# Patient Record
Sex: MALE | Race: WHITE
[De-identification: names, ages, dates, MRNs, and addresses within clinical notes are randomized per-mention and may not be internally consistent; named-entity substitution may affect disease eponyms.]

---

## 2018-10-01 ENCOUNTER — HOSPITAL ENCOUNTER (EMERGENCY)
Dept: HOSPITAL 63 - ER | Age: 63
Discharge: HOME | End: 2018-10-01
Payer: SELF-PAY

## 2018-10-01 VITALS
BODY MASS INDEX: 32.21 KG/M2 | SYSTOLIC BLOOD PRESSURE: 157 MMHG | WEIGHT: 225 LBS | HEIGHT: 70 IN | DIASTOLIC BLOOD PRESSURE: 113 MMHG
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
## 2018-10-01 DIAGNOSIS — Z88.6: ICD-10-CM
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
## 2018-10-01 DIAGNOSIS — Z88.0: ICD-10-CM

## 2018-10-01 DIAGNOSIS — Z91.041: ICD-10-CM
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
## 2018-10-01 DIAGNOSIS — S61.011A: Primary | ICD-10-CM

## 2018-10-01 PROCEDURE — 90471 IMMUNIZATION ADMIN: CPT

## 2018-10-01 PROCEDURE — 90714 TD VACC NO PRESV 7 YRS+ IM: CPT

## 2018-10-01 PROCEDURE — 12002 RPR S/N/AX/GEN/TRNK2.6-7.5CM: CPT

## 2018-10-01 NOTE — ED.ADGEN
Past History


Past Medical History:  Other


Past Surgical History:  Other


Alcohol Use:  Occasionally


Drug Use:  None





Adult General


Chief Complaint


Chief Complaint


".. I was cutting a water melon.. and slice my  Rt. thumb and nail..."..." I 

happen just before I came in..."





HPI


HPI





Patient is a 62 year old male who presents with above hx and  3 cm laceration 

to  right   thumb nail.  The laceration is shallow however does go through the 

nail and into the underlying nail soft tissue.  Distal neurovascular intact. 

Patient does not return his last tetanus. Patient is ambidextrous. Does travel 

for his company. No history immunosuppression. Normally healthy. Has follow-up 

primary care.





Review of Systems


Review of Systems





Constitutional: Denies fever or chills []


Eyes: Denies change in visual acuity, redness, or eye pain []


HENT: Denies nasal congestion or sore throat []


Respiratory: Denies cough or shortness of breath []


Cardiovascular: No additional information not addressed in HPI []


GI: Denies abdominal pain, nausea, vomiting, bloody stools or diarrhea []


: Denies dysuria or hematuria []


Musculoskeletal: Denies back pain or joint pain []


Integument: Denies rash or skin lesions []complaints of laceration of right 

thumb nail


Neurologic: Denies headache, focal weakness or sensory changes []


Endocrine: Denies polyuria or polydipsia []





All other systems were reviewed and found to be within normal limits, except as 

documented in this note.





Family History


Family History


Noncontributory





Current Medications


Current Medications





Current Medications








 Medications


  (Trade)  Dose


 Ordered  Sig/[facility]  Start Time


 Stop Time Status Last Admin


Dose Admin


 


 Tetanus/


 Diphtheria


 Toxoids Adsorbed


  (Tenivac Vial)  0.5 ml  ONCE ONCE  10/1/18 19:00


 10/1/18 19:01 DC 10/1/18 19:06


0.5 ML





See nursing for home meds





Allergies


Allergies





Allergies








Coded Allergies Type Severity Reaction Last Updated Verified


 


  Penicillins Allergy Unknown  10/1/18 Yes


 


  ibuprofen Allergy Unknown  10/1/18 Yes


 


  iodine Allergy Unknown  10/1/18 Yes











Physical Exam


Physical Exam





Constitutional: Well developed, well nourished, no acute distress, non-toxic 

appearance. []


HENT: Normocephalic, atraumatic, bilateral external ears normal, oropharynx 

moist, no oral exudates, nose normal. []


Eyes: PERRLA, EOMI, conjunctiva normal, no discharge. [] 


Neck: Normal range of motion, no tenderness, supple, no stridor. [] 


Cardiovascular:Heart rate regular rhythm, no murmur []


Lungs & Thorax:  Bilateral breath sounds clear to auscultation []


Abdomen: Bowel sounds normal, soft, no tenderness, no masses, no pulsatile 

masses. [] 


Skin: Warm, dry, no erythema, no rash. [] Laceration as per history of present 

illness


Back: No tenderness, no CVA tenderness. [] 


Extremities: No tenderness, no cyanosis, no clubbing, ROM intact, no edema. [] 


Neurologic: Alert and oriented X 3, normal motor function, normal sensory 

function, no focal deficits noted. []


Psychologic: Affect normal, judgement normal, mood normal. []





Current Patient Data


Vital Signs





 Vital Signs








  Date Time  Temp Pulse Resp B/P (MAP) Pulse Ox O2 Delivery O2 Flow Rate FiO2


 


10/1/18 16:32 98.3 52 18  95 Room Air  











EKG


EKG


[]





Radiology/Procedures


Radiology/Procedures


[]





Course & Med Decision Making


Course & Med Decision Making


Pertinent Labs and Imaging studies reviewed. (See chart for details).





Wound wash with surgical soap. Saline and peroxide. Application of tissue 

loose. And dressing applied.








  Keep laceration clean and dry. Do not apply ointment to laceration. Will 

dissolved to glue. The removed dressing becomes wet or dirty. Follow-up primary 

care. Monitor for infection. Patient informed he'll take a long time for the 

nail to grow out. Patient return if any concerns.











[]





Final Impression


Final Impression


1. 3 cm laceration, thumb nail of right hand[]





Dragon Disclaimer


Dragon Disclaimer


This electronic medical record was generated, in whole or in part, using a 

voice recognition dictation system.











SHANIKA ACEVES MD Oct 1, 2018 18:54

## 2021-10-17 ENCOUNTER — HOSPITAL ENCOUNTER (INPATIENT)
Dept: HOSPITAL 63 - ER | Age: 66
LOS: 2 days | Discharge: HOME | DRG: 177 | End: 2021-10-19
Attending: HOSPITALIST | Admitting: HOSPITALIST
Payer: COMMERCIAL

## 2021-10-17 VITALS — WEIGHT: 249.56 LBS | BODY MASS INDEX: 34.17 KG/M2 | HEIGHT: 71.5 IN

## 2021-10-17 DIAGNOSIS — R09.02: ICD-10-CM

## 2021-10-17 DIAGNOSIS — J12.82: ICD-10-CM

## 2021-10-17 DIAGNOSIS — Z88.0: ICD-10-CM

## 2021-10-17 DIAGNOSIS — U07.1: Primary | ICD-10-CM

## 2021-10-17 DIAGNOSIS — Z88.8: ICD-10-CM

## 2021-10-17 LAB
% BANDS: 5 % (ref 0–9)
% LYMPHS: 11 % (ref 24–48)
% MONOS: 9 % (ref 0–10)
% SEGS: 75 % (ref 35–66)
ALBUMIN SERPL-MCNC: 3.1 G/DL (ref 3.4–5)
ALBUMIN/GLOB SERPL: 0.8 {RATIO} (ref 1–1.7)
ALP SERPL-CCNC: 57 U/L (ref 46–116)
ALT SERPL-CCNC: 53 U/L (ref 16–63)
ANION GAP SERPL CALC-SCNC: 10 MMOL/L (ref 6–14)
APTT PPP: YELLOW S
AST SERPL-CCNC: 45 U/L (ref 15–37)
BACTERIA #/AREA URNS HPF: 0 /HPF
BASOPHILS # BLD AUTO: 0 X10^3/UL (ref 0–0.2)
BASOPHILS NFR BLD: 0 % (ref 0–3)
BILIRUB SERPL-MCNC: 0.4 MG/DL (ref 0.2–1)
BILIRUB UR QL STRIP: (no result)
BUN/CREAT SERPL: 21 (ref 6–20)
CA-I SERPL ISE-MCNC: 17 MG/DL (ref 8–26)
CALCIUM SERPL-MCNC: 8.2 MG/DL (ref 8.5–10.1)
CHLORIDE SERPL-SCNC: 94 MMOL/L (ref 98–107)
CO2 SERPL-SCNC: 25 MMOL/L (ref 21–32)
CREAT SERPL-MCNC: 0.8 MG/DL (ref 0.7–1.3)
EOSINOPHIL NFR BLD: 0 % (ref 0–3)
EOSINOPHIL NFR BLD: 0 X10^3/UL (ref 0–0.7)
ERYTHROCYTE [DISTWIDTH] IN BLOOD BY AUTOMATED COUNT: 13.3 % (ref 11.5–14.5)
FIBRINOGEN PPP-MCNC: CLEAR MG/DL
GFR SERPLBLD BASED ON 1.73 SQ M-ARVRAT: 97.3 ML/MIN
GLOBULIN SER-MCNC: 4.1 G/DL (ref 2.2–3.8)
GLUCOSE SERPL-MCNC: 155 MG/DL (ref 70–99)
GLUCOSE UR STRIP-MCNC: (no result) MG/DL
HCT VFR BLD CALC: 44.7 % (ref 39–53)
HGB BLD-MCNC: 15.2 G/DL (ref 13–17.5)
LYMPHOCYTES # BLD: 0.9 X10^3/UL (ref 1–4.8)
LYMPHOCYTES NFR BLD AUTO: 11 % (ref 24–48)
MCH RBC QN AUTO: 31 PG (ref 25–35)
MCHC RBC AUTO-ENTMCNC: 34 G/DL (ref 31–37)
MCV RBC AUTO: 91 FL (ref 79–100)
MONO #: 0.7 X10^3/UL (ref 0–1.1)
MONOCYTES NFR BLD: 9 % (ref 0–9)
NEUT #: 6.3 X10^3UL (ref 1.8–7.7)
NEUTROPHILS NFR BLD AUTO: 80 % (ref 31–73)
NITRITE UR QL STRIP: (no result)
PLATELET # BLD AUTO: 201 X10^3/UL (ref 140–400)
PLATELET # BLD EST: ADEQUATE 10*3/UL
POTASSIUM SERPL-SCNC: 4.2 MMOL/L (ref 3.5–5.1)
PROT SERPL-MCNC: 7.2 G/DL (ref 6.4–8.2)
RBC # BLD AUTO: 4.89 X10^6/UL (ref 4.3–5.7)
RBC #/AREA URNS HPF: 0 /HPF (ref 0–2)
SODIUM SERPL-SCNC: 129 MMOL/L (ref 136–145)
SP GR UR STRIP: 1.01
UROBILINOGEN UR-MCNC: 0.2 MG/DL
WBC # BLD AUTO: 7.9 X10^3/UL (ref 4–11)
WBC #/AREA URNS HPF: 0 /HPF (ref 0–4)

## 2021-10-17 PROCEDURE — 84484 ASSAY OF TROPONIN QUANT: CPT

## 2021-10-17 PROCEDURE — 82803 BLOOD GASES ANY COMBINATION: CPT

## 2021-10-17 PROCEDURE — 87040 BLOOD CULTURE FOR BACTERIA: CPT

## 2021-10-17 PROCEDURE — 83605 ASSAY OF LACTIC ACID: CPT

## 2021-10-17 PROCEDURE — 96368 THER/DIAG CONCURRENT INF: CPT

## 2021-10-17 PROCEDURE — 85025 COMPLETE CBC W/AUTO DIFF WBC: CPT

## 2021-10-17 PROCEDURE — 82550 ASSAY OF CK (CPK): CPT

## 2021-10-17 PROCEDURE — 96375 TX/PRO/DX INJ NEW DRUG ADDON: CPT

## 2021-10-17 PROCEDURE — 36415 COLL VENOUS BLD VENIPUNCTURE: CPT

## 2021-10-17 PROCEDURE — 83880 ASSAY OF NATRIURETIC PEPTIDE: CPT

## 2021-10-17 PROCEDURE — 80048 BASIC METABOLIC PNL TOTAL CA: CPT

## 2021-10-17 PROCEDURE — 96365 THER/PROPH/DIAG IV INF INIT: CPT

## 2021-10-17 PROCEDURE — 80053 COMPREHEN METABOLIC PANEL: CPT

## 2021-10-17 PROCEDURE — 93005 ELECTROCARDIOGRAM TRACING: CPT

## 2021-10-17 PROCEDURE — 81001 URINALYSIS AUTO W/SCOPE: CPT

## 2021-10-17 PROCEDURE — 85007 BL SMEAR W/DIFF WBC COUNT: CPT

## 2021-10-17 PROCEDURE — 71045 X-RAY EXAM CHEST 1 VIEW: CPT

## 2021-10-17 PROCEDURE — 96366 THER/PROPH/DIAG IV INF ADDON: CPT

## 2021-10-17 NOTE — RAD
PROCEDURE: XR CHEST 1V.10/17/2021 6:30 PM



REASON FOR STUDY: Reason: SHOB / Spl. Instructions:  / History: .



COMPARISON: None.



FINDINGS: There are patchy bilateral infiltrates most evident in the mid and lower lungs. No pleural 
fluid is seen. The heart is not grossly enlarged.



IMPRESSION: Bilateral infiltrates.



Electronically signed by: Willis Hendrickson Jr., MD (10/17/2021 6:31 PM) [facility]

## 2021-10-17 NOTE — PHYS DOC
Past History


Past Medical History:  No Pertinent History, Other


 (RUKHSANA CABRERA DO)


Past Surgical History:  Other


 (RUKHSANA CABRERA DO)


Smoking:  Non-smoker


Alcohol Use:  Rarely


Drug Use:  None


 (RUKHSANA CABRERA DO)





Adult General


Chief Complaint


Chief Complaint:  SHORTNESS OF BREATH





HPI


HPI





Patient is a 65-year-old male presenting for Covid complications.  Reports he is

otherwise healthy with no known medical diagnoses, no medications on a daily 

basis.  Reports he started feeling extremely weak and fatigued approximately 7 

days ago.  He waited several days and then on Wednesday, 4 days ago, he finally 

got checked at an outlying facility where he tested positive for Covid.  Denies 

any fever, loss of taste or smell, or GI symptoms.  Does admit muscle aches, 

fatigue, and URI symptoms.  Reports he has been trying to provide supportive 

care to himself utilizing ibuprofen and Tylenol at home without significant 

relief.  What prompted him to come in to ER for evaluation today was the fact 

that his O2 sat on finger pulse ox at home was averaging ~88% at rest and 

worsened with movement.  He did not receive any COVID-19 vaccinations


 (RUKHSANA CABRERA DO)





Review of Systems


Review of Systems


Fourteen body systems of review of systems have been reviewed. See HPI for per

tinent positives and negative responses, other wise all other systems are 

negative, non-pertinent or non-contributory


 (RUKHSANA CABRERA DO)





Allergies


Allergies





Allergies








Coded Allergies Type Severity Reaction Last Updated Verified


 


  Penicillins Allergy Unknown  10/1/18 Yes


 


  ibuprofen Allergy Unknown  10/1/18 Yes


 


  iodine Allergy Unknown  10/1/18 Yes








 (RUKHSANA CABRERA DO)





Physical Exam


Physical Exam


General: Appears fatigued, non toxic, and comfortable


Skin: Warm, dry. Normal for ethnicity.


HEENT: Atraumatic. PERRLA. Rhinorrhea and congestion. Nasal turbinates boggy 

b/l. Moist mucous membranes. Uvula midline. Maintaining secretions. No phonation

changes.


Neck: Trachea midline. Normal ROM. No stridor.


Respiratory: Normal WOB. CTAB w/o w/r/r. No tachypnea.


Cardiovascular: Regular rate and rhythm. Normal peripheral perfusion.


Abdomen: Soft. Non tender. No distension.


Back: Normal ROM.


Musculoskeletal: No swelling or deformity.


Neuro: Alert and oriented x 4. MAEE.


Lymph: No cervical LAD.


Psych: Normal affect and mood.


 (RUKHSANA CABRERA DO)





Current Patient Data


Vital Signs





Vital Signs








  Date Time  Temp Pulse Resp B/P (MAP) Pulse Ox O2 Delivery O2 Flow Rate FiO2


 


10/17/21 17:14 99.8 70 18 133/77 (95) 89 Room Air  


 


10/17/21 17:53       2.0 








Vital Signs








  Date Time  Temp Pulse Resp B/P (MAP) Pulse Ox O2 Delivery O2 Flow Rate FiO2


 


10/19/21 10:20  93 24  92 Nasal Cannula 2.0 


 


10/19/21 10:00    136/74 (94)    


 


10/19/21 06:00 99.2       








 (RUKHSANA CABRERA DO)





EKG


EKG


EKG ordered and interpreted by myself at 1735 hrs. is sinus rhythm at 68 bpm, 

unremarkable intervals, left axis deviation, no acute ischemic findings, no 

STEMI


 (RUKHSANA CABRERA DO)





Radiology/Procedures


Radiology/Procedures


[]


 (RKUHSANA CABRERA DO)


Impressions:


PROCEDURE: XR CHEST 1V.10/17/2021 6:30 PM





REASON FOR STUDY: Reason: SHOB / Spl. Instructions:  / History: .





COMPARISON: None.





FINDINGS: There are patchy bilateral infiltrates most evident in the mid and 

lower lungs. No pleural fluid is seen. The heart is not grossly enlarged.





IMPRESSION: Bilateral infiltrates.





Electronically signed by: Tim Hendrickson Jr., MD (10/17/2021 6:31 PM) [facility]














DICTATED AND SIGNED BY:     TIM HENDRICKSON Jr, MD


DATE:     10/17/21 1830





CC: HALEY DUNCAN DO; RUKHSANA CABRERA DO; PCP,UNKNOWN ~MTH0 0


 (HALEY DUNCAN DO)





Heart Score


C/O Chest Pain:  No


Risk Factors:


Risk Factors:  DM, Current or recent (<one month) smoker, HTN, HLP, family 

history of CAD, obesity.


Risk Scores:


Risk Factors:  DM, Current or recent (<one month) smoker, HTN, HLP, family 

history of CAD, obesity.


 (RUKHSANA CABRERA DO)


C/O Chest Pain:  N/A


 (ROCIO MENDEZ MD)


Course & Med Decision Making


Course & Med Decision Making


Airway patent, breathing unlabored, IV access and vitals obtained concerning for

 hypoxia on room air at 88% while at rest


HPI, physical exam and diagnostic work-up started.  At this time in care, my 

shift is ending.  Comprehensive signout given to oncoming physician. Please de

frida to Dr. Taylor's documentation regarding future care of patient in ER 

setting


 (RUKHSANA CABRERA DO)


Course & Med Decision Making


The patient's chest x-ray is significant for bilateral infiltrates consistent 

with COVID-19.  He is requiring supplemental oxygen.  I will admit him to the 

hospital.  I spoke with Dr. Jones and he has recommended the patient be transferred

 to [facility].  Spoke with Dr. Davila and he has accepted the 

patient for transfer and admission.  The patient will go by ambulance.


 (HALEY DUNCAN DO)


Course & Med Decision Making


Patient pending transfer to [place] at shift change.  There are no beds at 

[place], nursing supervisor discussed with hospitalist Dr. Jones who will accept

 patient in this facility.


 (ROCIO MENDEZ MD)


Dragon Disclaimer


Dragon Disclaimer


This electronic medical record was generated, in whole or in part, using a voice

 recognition dictation system.


 (RUKHSANA CABRERA DO)





Departure


Departure:


Impression:  


   Primary Impression:  


   COVID-19


   Additional Impression:  


   Bilateral pneumonia


Disposition:  09 ADMITTED AS INPATIENT


Admitting Physician:  Jerry Jones


 (ROCIO MENDEZ MD)


Condition:  STABLE


Referrals:  


PCP,UNKNOWN (PCP)





Problem Qualifiers











RUKHSANA CABRERA DO                 Oct 17, 2021 17:37


HALEY DUNCAN DO                 Oct 17, 2021 18:55


ROCIO MENDEZ MD            Oct 18, 2021 09:44

## 2021-10-17 NOTE — EKG
[facility] [address], [place] [address]

Test Date:    2021-10-17               Test Time:    17:27:57

Pat Name:     MAURIZIO CHIANG         Department:   

Patient ID:   [id number]           Room:          

Gender:       M                        Technician:   FABIOLA

:          1955               Requested By: RUKHSANA CABRERA

Order Number: [id number]            Reading MD:   Qamar Preston MD

                                 Measurements

Intervals                              Axis          

Rate:         68                       P:            31

MA:           144                      QRS:          -34

QRSD:         102                      T:            7

QT:           376                                    

QTc:          404                                    

                           Interpretive Statements

SINUS RHYTHM

ABNORMAL LEFT AXIS DEVIATION

LEFT ANTERIOR FASCICULAR BLOCK

ABNORMAL ECG

Electronically Signed On 10- 10:20:36 CDT by Qamar Preston MD

## 2021-10-18 VITALS — DIASTOLIC BLOOD PRESSURE: 76 MMHG | SYSTOLIC BLOOD PRESSURE: 139 MMHG

## 2021-10-18 VITALS — DIASTOLIC BLOOD PRESSURE: 84 MMHG | SYSTOLIC BLOOD PRESSURE: 153 MMHG

## 2021-10-18 VITALS — SYSTOLIC BLOOD PRESSURE: 143 MMHG | DIASTOLIC BLOOD PRESSURE: 83 MMHG

## 2021-10-18 VITALS — DIASTOLIC BLOOD PRESSURE: 72 MMHG | SYSTOLIC BLOOD PRESSURE: 125 MMHG

## 2021-10-18 VITALS — SYSTOLIC BLOOD PRESSURE: 139 MMHG | DIASTOLIC BLOOD PRESSURE: 76 MMHG

## 2021-10-18 RX ADMIN — METHYLPREDNISOLONE SODIUM SUCCINATE SCH MG: 40 INJECTION, POWDER, FOR SOLUTION INTRAMUSCULAR; INTRAVENOUS at 20:45

## 2021-10-18 NOTE — NUR
PATIENT IS 65 Y O MALE ARRIVED VIA EMS. PATIENT IS A/O X 4, CALM AND COOPERATIVE UPON 
ASSESSMENT. PATIENT C/O WEAKNESS AND FATIGUE, DENIED PAIN, DENIED NAUSEA. PATIENT ORIENTED 
TO THE ROOM AND HOSPITAL POLICIES. PATIENT IS CURRENTLY IN A BED RESTING .

## 2021-10-19 VITALS — SYSTOLIC BLOOD PRESSURE: 108 MMHG | DIASTOLIC BLOOD PRESSURE: 67 MMHG

## 2021-10-19 VITALS — DIASTOLIC BLOOD PRESSURE: 74 MMHG | SYSTOLIC BLOOD PRESSURE: 136 MMHG

## 2021-10-19 LAB
ANION GAP SERPL CALC-SCNC: 8 MMOL/L (ref 6–14)
BASOPHILS # BLD AUTO: 0 X10^3/UL (ref 0–0.2)
BASOPHILS NFR BLD: 0 % (ref 0–3)
CA-I SERPL ISE-MCNC: 16 MG/DL (ref 8–26)
CALCIUM SERPL-MCNC: 8.3 MG/DL (ref 8.5–10.1)
CHLORIDE SERPL-SCNC: 99 MMOL/L (ref 98–107)
CO2 SERPL-SCNC: 27 MMOL/L (ref 21–32)
CREAT SERPL-MCNC: 0.8 MG/DL (ref 0.7–1.3)
EOSINOPHIL NFR BLD: 0 % (ref 0–3)
EOSINOPHIL NFR BLD: 0 X10^3/UL (ref 0–0.7)
ERYTHROCYTE [DISTWIDTH] IN BLOOD BY AUTOMATED COUNT: 13.6 % (ref 11.5–14.5)
GFR SERPLBLD BASED ON 1.73 SQ M-ARVRAT: 97.3 ML/MIN
GLUCOSE SERPL-MCNC: 149 MG/DL (ref 70–99)
HCT VFR BLD CALC: 44.3 % (ref 39–53)
HGB BLD-MCNC: 14.8 G/DL (ref 13–17.5)
LYMPHOCYTES # BLD: 1.2 X10^3/UL (ref 1–4.8)
LYMPHOCYTES NFR BLD AUTO: 11 % (ref 24–48)
MCH RBC QN AUTO: 31 PG (ref 25–35)
MCHC RBC AUTO-ENTMCNC: 33 G/DL (ref 31–37)
MCV RBC AUTO: 92 FL (ref 79–100)
MONO #: 0.9 X10^3/UL (ref 0–1.1)
MONOCYTES NFR BLD: 8 % (ref 0–9)
NEUT #: 9.4 X10^3UL (ref 1.8–7.7)
NEUTROPHILS NFR BLD AUTO: 81 % (ref 31–73)
PLATELET # BLD AUTO: 244 X10^3/UL (ref 140–400)
POTASSIUM SERPL-SCNC: 4.5 MMOL/L (ref 3.5–5.1)
RBC # BLD AUTO: 4.83 X10^6/UL (ref 4.3–5.7)
SODIUM SERPL-SCNC: 134 MMOL/L (ref 136–145)
WBC # BLD AUTO: 11.5 X10^3/UL (ref 4–11)

## 2021-10-19 RX ADMIN — METHYLPREDNISOLONE SODIUM SUCCINATE SCH MG: 40 INJECTION, POWDER, FOR SOLUTION INTRAMUSCULAR; INTRAVENOUS at 10:27

## 2021-10-19 NOTE — DS
DATE OF DISCHARGE: 10/19/2021

ATTENDING PHYSICIAN:  Dr. Jones.



FINAL DISCHARGE DIAGNOSES:

1.  COVID-19 pneumonia.

2.  Hypoxemia, requiring supplemental oxygen.



HISTORY AND PHYSICAL:  The patient is a pleasant, healthy 65-year-old gentleman 

who still works full time.  He came down with flu-like symptoms.  He was tested 

positive for coronavirus a week ago.  He was doing well at home, but he got more

short of breath in the ED on this admission. He is about 9 days out.  He had 

diminished saturation.  Chest x-ray showed patchy infiltrates, both lower lobes.

 His oxygen saturations were marginal at 88%.  He required 2 liters of 

supplemental oxygen.  He was admitted for further treatment and evaluation.



PHYSICAL EXAMINATION:  Please see the dictated note.



PERTINENT LABORATORY AND X-RAY STUDIES:  Admission hemoglobin was 15.2 g/dL with

a white count of 7900.  Electrolytes:  Sodium 122, repeat was 134 mEq; 

creatinine was normal.  Nonfasting blood sugar 148.  Cardiac enzymes negative.  

Transaminases are normal.



IMAGING STUDIES:  Chest x-ray showed patchy bilateral infiltrates in the lower 

lobes, small pleural effusions identified.



COURSE IN THE HOSPITAL:  The patient was admitted.  He was started on 

supplemental oxygen.  We ordered empiric Solu-Medrol as well as Lovenox.  He did

well.  By the second hospital day, he was moving airways well.  He wanted to go 

home to supplement with supplemental oxygen.  Arrangements were made for oxygen 

to be delivered to his house at 2 liters by nasal cannula.  He will follow up 

with his PCP.  I did write a script for prednisone 40 mg p.o. daily.  He 

understands quarantine precautions.  The patient was then discharged from our 

hospital in stable condition with explicit drug and followup care.



Total discharge time spent was 39 minutes.







MAJOR ESPOSITO: Nick   DD: 10/19/2021 10:50

DT: 10/19/2021 12:13   TID: [id number]